# Patient Record
Sex: MALE | Race: AMERICAN INDIAN OR ALASKA NATIVE
[De-identification: names, ages, dates, MRNs, and addresses within clinical notes are randomized per-mention and may not be internally consistent; named-entity substitution may affect disease eponyms.]

---

## 2018-06-24 NOTE — EMERGENCY DEPARTMENT REPORT
ED Rash HPI





- HPI


Chief Complaint: Skin Rash


Stated Complaint: RASH


Time Seen by Provider: 06/24/18 17:34


Duration: Today


Location: Back, Upper Extremities


Rash Symptoms: Yes Itching, No Facial Swelling, No Tongue/Oral Swelling, No 

Breathing Difficulties, No Choking Sensation, No Wheezing/Dyspnea, No Peeling, 

No Blistering, No Fever, No Lightheaded, No Malaise, No Myalgias


Severity: moderate


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 

contact with anything in particular that may cause allergic reaction having the 

patient has hives that developed on the left upper extremity.  He also has the 

same type lesion on his left lower back that has a small what appears to be 

pustule in the center.  Patient denies any fevers chills nausea vomiting at 

this time.





ED Review of Systems


ROS: 


Stated complaint: RASH


Other details as noted in HPI





Comment: All other systems reviewed and negative





ED Past Medical Hx





- Past Medical History


Hx Hypertension: Yes


Hx Asthma: Yes





- Surgical History


Additional Surgical History: hernia repair, esophagus surgery





- Social History


Smoking Status: Current Every Day Smoker


Substance Use Type: None





- Medications


Home Medications: 


 Home Medications











 Medication  Instructions  Recorded  Confirmed  Last Taken  Type


 


Sulfamethoxazole/Trimethoprim 1 each PO BID #14 tablet 06/24/18  Unknown Rx





[Bactrim DS TAB]     


 


diphenhydrAMINE [Benadryl CAP] 25 mg PO Q6HR #12 capsule 06/24/18  Unknown Rx


 


predniSONE [Deltasone] 10 mg PO QDAY #5 tab 06/24/18  Unknown Rx














Rash Exam





- Exam


General: 


Vital signs noted. No distress. Alert and acting appropriately.





HEENT: No Periorbital Edema, No Conjuctival Injection, No Chemosis, No Perioral 

Edema, No Tongue Edema, No Uvular Edema, No Compromised Airway, No Drooling


Lungs: Yes Good Air Exchange (Normal Breath Sounds), No Wheezes, No Ronchi, No 

Stridor, No Cough, No Labored Respirations, No Retractions, No Use of Accessory 

Muscles, No Other Abnormal Lung Sounds


Heart: Yes Regular, No Murmur


Skin: Yes Urticarial Rash (patient has a urticarial rash on the posterior left 

upper extremity.  Patient also has a erythematous indurated area on his left 

lower back that has a small pustule central.  There is no fluctuance associated 

with this lesion.), Yes Tenderness, No Maculopapular Rash, No Morbilliform rash

, No Bulla(e), No Excoriations, No Weeping, No Erythema


Other: Positive: Abdomen Normal, Neurologic Normal, Musculoskeletal Normal





ED Course


 Vital Signs











  06/24/18





  16:22


 


Temperature 98.4 F


 


Pulse Rate 83


 


Respiratory 18





Rate 


 


Blood Pressure 158/79


 


O2 Sat by Pulse 98





Oximetry 














ED Medical Decision Making





- Medical Decision Making





Because of cellulitis and urticarial lesion or both in the patient's 

differential patient will be treated with antibiotics as well as antiallergy 

medications.  The lesions on his upper extremity looks most similar to 

traditional hives over the lesion was lower back is towards a cellulitis in 

appearance.  Patient be placed on meds and discharged home.


Critical care attestation.: 


If time is entered above; I have spent that time in minutes in the direct care 

of this critically ill patient, excluding procedure time.








ED Disposition


Clinical Impression: 


 Hives





Cellulitis


Qualifiers:


 Site of cellulitis: extremity Site of cellulitis of extremity: upper extremity 

Laterality: left Qualified Code(s): L03.114 - Cellulitis of left upper limb





Disposition: DC-01 TO HOME OR SELFCARE


Is pt being admited?: No


Does the pt Need Aspirin: No


Condition: Stable


Instructions:  Cellulitis (ED), Urticaria (ED)


Prescriptions: 


diphenhydrAMINE [Benadryl CAP] 25 mg PO Q6HR #12 capsule


predniSONE [Deltasone] 10 mg PO QDAY #5 tab


Sulfamethoxazole/Trimethoprim [Bactrim DS TAB] 1 each PO BID #14 tablet


Referrals: 


PRIMARY CARE,MD [Primary Care Provider] - 3-5 Days

## 2019-12-04 ENCOUNTER — HOSPITAL ENCOUNTER (EMERGENCY)
Dept: HOSPITAL 5 - ED | Age: 39
Discharge: HOME | End: 2019-12-04
Payer: COMMERCIAL

## 2019-12-04 VITALS — DIASTOLIC BLOOD PRESSURE: 82 MMHG | SYSTOLIC BLOOD PRESSURE: 133 MMHG

## 2019-12-04 DIAGNOSIS — J02.9: Primary | ICD-10-CM

## 2019-12-04 DIAGNOSIS — J45.909: ICD-10-CM

## 2019-12-04 DIAGNOSIS — J06.9: ICD-10-CM

## 2019-12-04 DIAGNOSIS — I10: ICD-10-CM

## 2019-12-04 PROCEDURE — 71046 X-RAY EXAM CHEST 2 VIEWS: CPT

## 2019-12-04 PROCEDURE — 99283 EMERGENCY DEPT VISIT LOW MDM: CPT

## 2019-12-04 PROCEDURE — 96372 THER/PROPH/DIAG INJ SC/IM: CPT

## 2019-12-04 NOTE — XRAY REPORT
CHEST 2 VIEWS 



INDICATION / CLINICAL INFORMATION:

COUGH.



COMPARISON: 

None available.



FINDINGS:



SUPPORT DEVICES: None.



HEART / MEDIASTINUM: No significant abnormality. 



LUNGS / PLEURA: No significant pulmonary or pleural abnormality. No pneumothorax. 



ADDITIONAL FINDINGS: No significant additional findings.



IMPRESSION:

1. No acute findings.



Signer Name: Shiva Jones MD 

Signed: 12/4/2019 4:21 AM

 Workstation Name: [id number]

## 2019-12-04 NOTE — EMERGENCY DEPARTMENT REPORT
- General


Chief Complaint: Dyspnea/Respdistress


Stated Complaint: KAILYN ASTHMA


Source: patient


Mode of arrival: Ambulatory


Limitations: No Limitations





- History of Present Illness


Initial Comments: 





Patient is a 39-year-old -American male with a history of asthma who 

presents to the ED with complaint of acute onset persistent nasal and sinus 

congestion, persistent dry cough and wheezing intermittently for 2 days worse in

the last 2 hours.  Patient states that he ran out of his albuterol inhaler about

a week ago and tonight while sleeping he woke up with shortness of breath and 

persistent cough and significant wheezing about 12 hours ago.  Patient denies 

fever, chills, chest pain, dizziness, sore throat, abdominal pain, hemoptysis, 

hematemesis, headache, nausea and vomiting or diarrhea.


MD Complaint: cough, rhinorrhea, nasal congestion, other (dyspnea, wheezing)


-: Sudden, days(s) (2)


Severity: moderate


Severity scale (0 -10): 5


Quality: dull


Consistency: intermittent


Improves With: nothing


Worsens With: nothing


Associated Symptoms: denies other symptoms, rhinorrhea, nasal congestion, cough,

shortness of breath.  denies: fever, chills, myalgias, diaphoresis, headache, 

sore throat, stiff neck, chest pain, abdominal pain, nausea, vomiting, diarrhea,

dysuria, confusion, right sweats, hoarseness


Treatments Prior to Arrival: none





- Related Data


                                  Previous Rx's











 Medication  Instructions  Recorded  Last Taken  Type


 


Sulfamethoxazole/Trimethoprim 1 each PO BID #14 tablet 06/24/18 Unknown Rx





[Bactrim DS TAB]    


 


diphenhydrAMINE [Benadryl CAP] 25 mg PO Q6HR #12 capsule 06/24/18 Unknown Rx


 


predniSONE [Deltasone] 10 mg PO QDAY #5 tab 06/24/18 Unknown Rx


 


ALBUTEROL Inhaler (OR & NICU) 1 - 2 puff IH Q6H PRN #1 inh 12/04/19 Unknown Rx





[ProAir HFA Inhaler]    


 


Benzonatate [Tessalon Perles] 100 mg PO Q8HR #30 capsule 12/04/19 Unknown Rx


 


Cetirizine HCl [Zyrtec 10mg tab] 10 mg PO DAILY #30 tablet 12/04/19 Unknown Rx


 


Ibuprofen [Motrin] 800 mg PO Q8HR PRN #20 tablet 12/04/19 Unknown Rx


 


methylPREDNISolone [Medrol 4MG 4 mg PO DAILY #21 tab.ds.pk 12/04/19 Unknown Rx





DOSEPAK (21 tabs)]    











                                    Allergies











Allergy/AdvReac Type Severity Reaction Status Date / Time


 


No Known Allergies Allergy   Unverified 06/24/18 16:22














ED Review of Systems


ROS: 


Stated complaint: KAILYN ASTHMA


Other details as noted in HPI





Constitutional: denies: chills, fever


Eyes: denies: eye pain, eye discharge, vision change


ENT: congestion.  denies: ear pain, throat pain


Respiratory: cough, shortness of breath, wheezing


Cardiovascular: denies: chest pain, palpitations


Endocrine: no symptoms reported


Gastrointestinal: denies: abdominal pain, nausea, diarrhea


Genitourinary: denies: urgency, dysuria


Musculoskeletal: denies: back pain, joint swelling, arthralgia


Skin: denies: rash, lesions


Neurological: denies: headache, weakness, paresthesias


Psychiatric: denies: anxiety, depression


Hematological/Lymphatic: denies: easy bleeding, easy bruising





ED Past Medical Hx





- Past Medical History


Previous Medical History?: Yes


Hx Hypertension: Yes


Hx Asthma: Yes


Additional medical history: sleep apnea





- Surgical History


Past Surgical History?: Yes


Additional Surgical History: 2hernia repair, esophagus surgery, brain sx





- Social History


Smoking Status: Former Smoker


Substance Use Type: None





- Medications


Home Medications: 


                                Home Medications











 Medication  Instructions  Recorded  Confirmed  Last Taken  Type


 


Sulfamethoxazole/Trimethoprim 1 each PO BID #14 tablet 06/24/18  Unknown Rx





[Bactrim DS TAB]     


 


diphenhydrAMINE [Benadryl CAP] 25 mg PO Q6HR #12 capsule 06/24/18  Unknown Rx


 


predniSONE [Deltasone] 10 mg PO QDAY #5 tab 06/24/18  Unknown Rx


 


ALBUTEROL Inhaler (OR & NICU) 1 - 2 puff IH Q6H PRN #1 inh 12/04/19  Unknown Rx





[ProAir HFA Inhaler]     


 


Benzonatate [Tessalon Perles] 100 mg PO Q8HR #30 capsule 12/04/19  Unknown Rx


 


Cetirizine HCl [Zyrtec 10mg tab] 10 mg PO DAILY #30 tablet 12/04/19  Unknown Rx


 


Ibuprofen [Motrin] 800 mg PO Q8HR PRN #20 tablet 12/04/19  Unknown Rx


 


methylPREDNISolone [Medrol 4MG 4 mg PO DAILY #21 tab.ds.pk 12/04/19  Unknown Rx





DOSEPAK (21 tabs)]     














ED Physical Exam





- General


Limitations: No Limitations


General appearance: alert, in no apparent distress





- Head


Head exam: Present: atraumatic, normocephalic, normal inspection





- Eye


Eye exam: Present: normal appearance, PERRL, EOMI


Pupils: Present: normal accommodation





- ENT


ENT exam: Present: normal orophraynx, mucous membranes moist, TM's normal 

bilaterally, normal external ear exam, other (grossly congested nasal passages)





- Neck


Neck exam: Present: normal inspection, full ROM.  Absent: tenderness





- Respiratory


Respiratory exam: Present: wheezes (mildly diffuse coarse wheezes throughout).  

Absent: respiratory distress, chest wall tenderness, accessory muscle use, 

prolonged expiratory





- Cardiovascular


Cardiovascular Exam: Present: regular rate, normal rhythm, normal heart sounds. 

Absent: systolic murmur, diastolic murmur, rubs, gallop





- GI/Abdominal


GI/Abdominal exam: Present: soft, normal bowel sounds.  Absent: tenderness, 

guarding, hyperactive bowel sounds





- Extremities Exam


Extremities exam: Present: normal inspection, full ROM, normal capillary refill





- Back Exam


Back exam: Present: normal inspection, full ROM.  Absent: muscle spasm, 

paraspinal tenderness





- Neurological Exam


Neurological exam: Present: alert, oriented X3, CN II-XII intact, normal gait, 

reflexes normal





- Psychiatric


Psychiatric exam: Present: normal affect, normal mood





- Skin


Skin exam: Present: warm, dry, intact, normal color.  Absent: rash





ED Course


                                   Vital Signs











  12/04/19 12/04/19





  03:11 05:40


 


Temperature 98.6 F 


 


Pulse Rate 95 H 78


 


Respiratory 18 18





Rate  


 


Blood Pressure 164/91 


 


Blood Pressure 164/91 133/82





[Right]  


 


O2 Sat by Pulse 92 96





Oximetry  














ED Medical Decision Making





- Radiology Data


Radiology results: report reviewed, image reviewed





Chest x-ray shows no acute cardiopulmonary abnormalities or pneumonitis.





- Medical Decision Making





This is a 39-year-old male with a history of asthma and [employer] 

albuterol inhaler at home 1 week ago presented to the ED with shortness of 

breath, dry cough, wheezing and nasal and sinus congestion.  In the ED, patient 

is alert and oriented 3 and is not in distress.  Patient received DuoNeb treat

ment and also Solu-Medrol.  Chest x-ray shows no acute cardiopulmonary 

abnormalities or pneumonitis.  On reevaluation, patient's wheezing has resolved 

and patient felt much better with  Oxygen saturation of 96% in room air.  The 

patient therefore discharged home on medications including albuterol inhaler 

prescription and was advised to follow-up with his primary care physician in 3-5

 days for reevaluation or return to the ED immediately if symptoms get worse.





- Differential Diagnosis


asthma; bronchitis; URI; Pneumonia; Sinusitis


Critical care attestation.: 


If time is entered above; I have spent that time in minutes in the direct care 

of this critically ill patient, excluding procedure time.








ED Disposition


Clinical Impression: 


 Acute bronchitis with asthma, Acute upper respiratory infection





Disposition: DC-01 TO HOME OR SELFCARE


Is pt being admited?: No


Does the pt Need Aspirin: No


Condition: Stable


Instructions:  Asthma (ED), Upper Respiratory Infection (ED), Acute Bronchitis 

(ED)


Additional Instructions: 


Take medication with food, drink plenty of fluids and follow-up with your 

primary care physician in 3-5 days for reevaluation.  Return to the ED 

immediately if symptoms get worse.


Prescriptions: 


methylPREDNISolone [Medrol 4MG DOSEPAK (21 tabs)] 4 mg PO DAILY #21 tab.ds.pk


Ibuprofen [Motrin] 800 mg PO Q8HR PRN #20 tablet


 PRN Reason: Pain , Severe (7-10)


ALBUTEROL Inhaler (OR & NICU) [ProAir HFA Inhaler] 1 - 2 puff IH Q6H PRN #1 inh


 PRN Reason: Dyspnea


Benzonatate [Tessalon Perles] 100 mg PO Q8HR #30 capsule


Cetirizine HCl [Zyrtec 10mg tab] 10 mg PO DAILY #30 tablet


Referrals: 


PRIMARY CARE,MD [Primary Care Provider] - 3-5 Days


Forms:  Work/School Release Form(ED)


Time of Disposition: 05:34


Print Language: ENGLISH

## 2022-02-22 ENCOUNTER — HOSPITAL ENCOUNTER (EMERGENCY)
Dept: HOSPITAL 5 - ED | Age: 42
Discharge: LEFT BEFORE DISCHARGE | End: 2022-02-22
Payer: COMMERCIAL

## 2022-02-22 VITALS — DIASTOLIC BLOOD PRESSURE: 93 MMHG | SYSTOLIC BLOOD PRESSURE: 155 MMHG

## 2022-02-22 DIAGNOSIS — Z53.21: ICD-10-CM

## 2022-02-22 DIAGNOSIS — H57.89: Primary | ICD-10-CM
